# Patient Record
Sex: FEMALE | Race: WHITE | Employment: STUDENT | URBAN - METROPOLITAN AREA
[De-identification: names, ages, dates, MRNs, and addresses within clinical notes are randomized per-mention and may not be internally consistent; named-entity substitution may affect disease eponyms.]

---
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
## 2022-05-10 NOTE — PROGRESS NOTES
Patient Quality Outreach    Patient is due for the following:   Colon Cancer Screening -  Colonoscopy  Breast Cancer Screening - Mammogram  Immunizations  -  Tdap, TD and Zoster    NEXT STEPS:   Patient has upcoming appointment, these items will be addressed at that time.    Type of outreach:    Sent [employer] message.    Next Steps:  Reach out within 90 days via [employer].    Max number of attempts reached: Yes. Will try again in 90 days if patient still on fail list.    Questions for provider review:    None     Kelvin Alvarez MA  Chart routed to Care Team.         TEAM REVIEW    Date: 10/21/19     The unit team and provider met, reviewed patient's case, problem goals and objectives.     Current Diagnoses:  303.90 (F10.20) Alcohol Use Disorder Moderate  304.30 (F12.20) Cannabis Use Disorder Severe  304.10 (F13.20) Sedative, Hypnotic, Anxiolytic Use Disorder Severe  305.10 (F17.200)  Tobacco Use Disorder Moderate  F43.1 Posttraumatic Stress Disorder  F33.1 Major Depressive Disorder, Recurrent, Moderate  F41.9 Generalized Anxiety Disorder     Safety concerns since last review (SI, SIB, HI)  Denies any safety related concerns (SI/SIB/HI); however, ct has threatened to elope from programming.     Chemical use since last review:  LDOU: 10/08/19 (THC and Xanax)     UA Results:    10/8/19 POS THC    Progress toward treatment goal:  Client has accepted being in residential treatment client's participating in all aspects of programming has increased in the past several days.     Other Therapy Interfering Behaviors:  Client continues to need staff prompts and encouragement to participate in groups and at times still passively participates in groups. Client lacks insight on the problematic (and dangerous) nature of her substance use and lifestyle choices.     Current medications/changes and medical concerns:  Current Outpatient Medications   Medication     ARIPiprazole (ABILIFY) 2 MG tablet     calcium carbonate 750 MG CHEW     cholecalciferol (VITAMIN D3) 5000 units (125 mcg) capsule     diphenhydrAMINE (BENADRYL) 25 MG tablet     FLUoxetine (PROZAC) 40 MG capsule     polyethylene glycol (MIRALAX/GLYCOLAX) powder     Current Facility-Administered Medications   Medication     albuterol (PROAIR HFA/PROVENTIL HFA/VENTOLIN HFA) 108 (90 Base) MCG/ACT inhaler 2 puff     Facility-Administered Medications Ordered in Other Encounters   Medication     acetaminophen (TYLENOL) tablet 325 mg     acetaminophen (TYLENOL) tablet 650 mg     benzocaine-menthol (CEPACOL) 15-3.6 MG lozenge 1 lozenge      calcium carbonate (TUMS) chewable tablet 1,000 mg     ibuprofen (ADVIL/MOTRIN) tablet 200 mg     ibuprofen (ADVIL/MOTRIN) tablet 400 mg     melatonin half-tab 3 mg    Or     melatonin half-tab 6 mg       Family Involvement -  Client's mother is readily available by phone. Client's mother participated in a family session on 10/15/19.     Current assignments:  Assessing My Use     Current Phase:  0     Tasks:  Schedule a family session   Insurance Review on 10/22/19  Continue to build ambivalence about substance use     Discharge Planning:  Target Discharge Date/Timeframe:  45-60 days from admission   Med Mgmt Provider/Appt: [facility]   Ind therapy Provider/Appt:  VALERIE Valenzuela LADC ([place])   Family therapy Provider/Appt: VALERIE Valenzuela LADC ([place])   IOP: [facility]   School enrollment:  [employer]   Other referrals: Follow recommendations of diversion     Attended by:  SHERMAN Rooney LADC Jessica Feider, LADC, [facility]  Chrystal Perdue, , SHERMAN,[facility]  Dr. Gregorio Villatoro RN

## 2023-01-24 NOTE — ED NOTES
69 Discharge instructions given to pt and pt's mother. Discharge instructions understood.   [phone]

## 2023-04-13 NOTE — PROGRESS NOTES
11/21/2019 Dimension 3, 4, 5 and 6  Group Chart Note - Co-facilitated with Brenda Chatterjee [facility].  Number of clients attending the group:  3      Soraya Linares attended 1 hour Psychoeducation group covering the following topics Self esteem.  Client was Attentive.  Client's response:  Client was present for a group activity in which we had clients interview each other in order to get to know each other better.  She then participated in a discussion regarding self esteem.         [facility] Counseling:  I discussed with the patient the risks of topical clascoterone including but not limited to erythema, scaling, itching, and stinging. Patient voiced their understanding.

## 2024-12-18 NOTE — PROGRESS NOTES
04/10/18 1000   Visit Information   Visit Made By Staff    Type of Visit Spirituality Group   Spiritual Health Services  Behavioral Health  Spirituality Group Note     Unit: [facility]     Name: Soraya Linares                            YOB: 2000   MRN: [id number]                               Age: 17 year old  Patient attended Sprituality group, co-led by  and counselor  [facility] which included activities and discussion of spirituality, coping with illness, and building resilience.  Patient attended group for 1 hr and participated in the group discussion and activities about finding balance through spiritual practices, demonstrating an appreciation of the topics application for their personal circumstances and well being.  Gia Lai M.Div.  Staff   Pager [phone]     Patient should make an appointment to discuss the results and assess whether or not she is having pain and if further intervention is indicated.  Recommend she make an appointment to follow-up with me.  I typically will order my own x-rays if indicated.